# Patient Record
(demographics unavailable — no encounter records)

---

## 2025-07-22 NOTE — DISCUSSION/SUMMARY
[FreeTextEntry1] : Pap smear is performed.  Regarding her endometrial polyp I had a discussion.  I discussed low likelihood of premalignant or malignant situation at her age and body habitus.  I discussed procedure to remove this with dilation and curettage as well as polypectomy using [employer].  We discussed preoperative Cytotec.  Risks of bleeding infection were discussed and she will be scheduled.

## 2025-07-22 NOTE — HISTORY OF PRESENT ILLNESS
[FreeTextEntry1] : 41-year-old G0 here for annual visit.  She is here for well visit as well as to follow-up on her fertility testing.  Patient has been followed by [facility] for her infertility.  Patient reports multiple cycles to harvest her eggs but has not had embryo transfer yet.  She was incidentally noted to have an endometrial polyp and they have advised a polypectomy.  She has a known small fibroid uterus.  She otherwise has medical history of hypothyroidism.  Surgical history of back surgery.  She is G0.  She is single but monogamous and lives with her partner.  Patient reports normal mammogram in October 2024.  She denies any family history of breast ovarian or uterine cancer; father had colon cancer later in life.

## 2025-07-22 NOTE — PHYSICAL EXAM
[Chaperoned Physical Exam] : A chaperone was present in the examining room during all aspects of the physical examination. [MA] : MA [FreeTextEntry2] : kelly main [Appropriately responsive] : appropriately responsive [Alert] : alert [No Acute Distress] : no acute distress [No Lymphadenopathy] : no lymphadenopathy [Regular Rate Rhythm] : regular rate rhythm [No Murmurs] : no murmurs [Clear to Auscultation B/L] : clear to auscultation bilaterally [Soft] : soft [Non-tender] : non-tender [Non-distended] : non-distended [No HSM] : No HSM [No Lesions] : no lesions [No Mass] : no mass [Oriented x3] : oriented x3 [Examination Of The Breasts] : a normal appearance [No Masses] : no breast masses were palpable [Labia Majora] : normal [Labia Minora] : normal [Normal] : normal [Uterine Adnexae] : normal